# Patient Record
Sex: FEMALE | URBAN - METROPOLITAN AREA
[De-identification: names, ages, dates, MRNs, and addresses within clinical notes are randomized per-mention and may not be internally consistent; named-entity substitution may affect disease eponyms.]

---

## 2023-05-16 ENCOUNTER — TELEPHONE (OUTPATIENT)
Dept: PAIN MEDICINE | Facility: CLINIC | Age: 48
End: 2023-05-16

## 2023-05-16 NOTE — TELEPHONE ENCOUNTER
I spoke to the future patient, she was calling to set up an appointment with Dr. Jerez for Pain Management. Explained to patient that we are on a referral system. States her PCP is Dr. Mann, advised her to call his office and have referral sent. Patient verbalized understanding.